# Patient Record
Sex: FEMALE | Race: BLACK OR AFRICAN AMERICAN | NOT HISPANIC OR LATINO | Employment: OTHER | URBAN - METROPOLITAN AREA
[De-identification: names, ages, dates, MRNs, and addresses within clinical notes are randomized per-mention and may not be internally consistent; named-entity substitution may affect disease eponyms.]

---

## 2017-08-01 ENCOUNTER — OFFICE VISIT (OUTPATIENT)
Dept: FAMILY MEDICINE | Facility: HOSPITAL | Age: 57
End: 2017-08-01
Attending: FAMILY MEDICINE
Payer: COMMERCIAL

## 2017-08-01 VITALS
WEIGHT: 273.38 LBS | DIASTOLIC BLOOD PRESSURE: 93 MMHG | BODY MASS INDEX: 48.44 KG/M2 | HEIGHT: 63 IN | HEART RATE: 77 BPM | SYSTOLIC BLOOD PRESSURE: 156 MMHG

## 2017-08-01 DIAGNOSIS — G47.33 OSA (OBSTRUCTIVE SLEEP APNEA): ICD-10-CM

## 2017-08-01 DIAGNOSIS — M54.41 CHRONIC LOW BACK PAIN WITH RIGHT-SIDED SCIATICA, UNSPECIFIED BACK PAIN LATERALITY: ICD-10-CM

## 2017-08-01 DIAGNOSIS — M79.604 LEG PAIN, ANTERIOR, RIGHT: ICD-10-CM

## 2017-08-01 DIAGNOSIS — G89.29 BILATERAL CHRONIC KNEE PAIN: ICD-10-CM

## 2017-08-01 DIAGNOSIS — Z86.010 PERSONAL HISTORY OF COLONIC POLYPS: ICD-10-CM

## 2017-08-01 DIAGNOSIS — N62 LARGE BREASTS: ICD-10-CM

## 2017-08-01 DIAGNOSIS — B18.2 CHRONIC HEPATITIS C WITHOUT HEPATIC COMA: ICD-10-CM

## 2017-08-01 DIAGNOSIS — M48.062 SPINAL STENOSIS OF LUMBAR REGION WITH NEUROGENIC CLAUDICATION: ICD-10-CM

## 2017-08-01 DIAGNOSIS — F32.A CHRONIC DEPRESSION: ICD-10-CM

## 2017-08-01 DIAGNOSIS — M25.562 BILATERAL CHRONIC KNEE PAIN: ICD-10-CM

## 2017-08-01 DIAGNOSIS — E66.01 MORBID OBESITY DUE TO EXCESS CALORIES: ICD-10-CM

## 2017-08-01 DIAGNOSIS — G89.4 CHRONIC PAIN ASSOCIATED WITH SIGNIFICANT PSYCHOSOCIAL DYSFUNCTION: ICD-10-CM

## 2017-08-01 DIAGNOSIS — J30.89 ALLERGIC RHINITIS DUE TO DUST MITE: ICD-10-CM

## 2017-08-01 DIAGNOSIS — M25.561 BILATERAL CHRONIC KNEE PAIN: ICD-10-CM

## 2017-08-01 DIAGNOSIS — G89.29 CHRONIC LOW BACK PAIN WITH RIGHT-SIDED SCIATICA, UNSPECIFIED BACK PAIN LATERALITY: ICD-10-CM

## 2017-08-01 DIAGNOSIS — I10 ESSENTIAL HYPERTENSION: Primary | ICD-10-CM

## 2017-08-01 PROCEDURE — 99215 OFFICE O/P EST HI 40 MIN: CPT | Performed by: FAMILY MEDICINE

## 2017-08-01 RX ORDER — GABAPENTIN 600 MG/1
600 TABLET ORAL 3 TIMES DAILY
Qty: 90 TABLET | Refills: 11 | Status: SHIPPED | OUTPATIENT
Start: 2017-08-01 | End: 2018-08-02 | Stop reason: SDUPTHER

## 2017-08-01 RX ORDER — DILTIAZEM HYDROCHLORIDE 300 MG/1
300 CAPSULE, EXTENDED RELEASE ORAL DAILY
Qty: 30 CAPSULE | Refills: 11 | Status: SHIPPED | OUTPATIENT
Start: 2017-08-01 | End: 2018-08-01

## 2017-08-01 RX ORDER — NORTRIPTYLINE HYDROCHLORIDE 50 MG/1
50 CAPSULE ORAL NIGHTLY
Qty: 30 CAPSULE | Refills: 11 | Status: SHIPPED | OUTPATIENT
Start: 2017-08-01 | End: 2018-08-01

## 2017-08-01 RX ORDER — CETIRIZINE HYDROCHLORIDE 10 MG/1
TABLET ORAL
Refills: 0 | COMMUNITY
Start: 2017-05-02 | End: 2017-08-02 | Stop reason: SDUPTHER

## 2017-08-01 RX ORDER — METOPROLOL SUCCINATE 100 MG/1
100 TABLET, EXTENDED RELEASE ORAL DAILY
Qty: 30 TABLET | Refills: 11 | Status: SHIPPED | OUTPATIENT
Start: 2017-08-01 | End: 2017-11-21

## 2017-08-01 RX ORDER — HYDRALAZINE HYDROCHLORIDE 50 MG/1
50 TABLET, FILM COATED ORAL EVERY 6 HOURS
Qty: 180 TABLET | Refills: 11 | Status: SHIPPED | OUTPATIENT
Start: 2017-08-01 | End: 2018-11-07 | Stop reason: SDUPTHER

## 2017-08-01 NOTE — PROGRESS NOTES
Patient ID: Farzad Rasheed is a 56 y.o. female.    Chief Complaint: Establish Care  Sees psychiatrist (Dr. Lerma) on [address] behind [facility].  Sees him on August 24, 2017.  Dr. Lopez, gastroenterology, had colonoscopy 4 years ago, and had 4 polys. Will call for appointment to see him and schedule another colonoscopy.  Had fibroids and hysterectomy in 2009.  States that had pap smear which she thinks was abnormal before surgery.  Was at [facility].  Will do pap in future.  Patient had oophorectomy at time of hysterectomy.  Last mammogram in 2-3 yrs. Will need to be rescheduled.          HPI:      ESSENTIAL HYPERTENSION : since 2004.  Currently on Diltiazem 300 mg 24 hr capsule. Hydralazine 50 mg  Qid, HCTZ 25 mg qday. Metoprolol 100 mg 24 hr once a day. Also was taking Avapro 300 mg per day for blood pressure but is no longer on this. Has chest pain, points with one finger ,describes as sharp.  No SOB, some SANTA, no PND orthopnea,  Some lower extremity edema.    DEPRESSION: taking xanax, gabapentin, celexa  topramax given by psychiatrist, Dr. Lerma.  Discussed with the patient that xanax is habit forming, and that this will be needed to be prescribed by her psychiatrist.    CHRONIC LOW BACK PAIN, AND RIGHT LEG PAIN AND LEFT KNEE PAIN .   Also takes Ibuprofen, but does not want them any more, because it has made her feel bad. Has history of spinal stenosis and  Bilateral knee pain. Will refer to physical therapy.  Will refer to pain specialist, Dr. Muhammad. Will increase Gabapentin to 600 mg tid. Will discontinue Welbutrin, and restart Pamelor 25 mg per HS.  TENS UNIT.     HEPATITIS C: treated with Harvani and recheck has been negative.    PERSONAL HISTORY OF COLON POLYPS: sees Dr. Lopez at  [facility].  Will call Dr. Thomason to make appointment for repeat colon check.    MORBID OBESITY: 48.43 BMI: REFER TO NUTRITION    SUSPECT OBSTR UCTIVE SLEEP APNEA: will need sleep study in future. Will refer to  Dr. Vasquez.  Patient currently lives in [place] and will be moving to [place] in the future.       HISTORY OF SPINAL STENOSIS: chronic low back pain.  See above.  Will make orthopedic referral.      LARGE BREAST: will need mammogram.        Past Medical History:   Diagnosis Date    Anxiety     Depression     Hypertension      Past Surgical History:   Procedure Laterality Date    HYSTERECTOMY       No family history on file.  Social History     Social History    Marital status:      Spouse name: N/A    Number of children: N/A    Years of education: N/A     Social History Main Topics    Smoking status: Smoker, Current Status Unknown    Smokeless tobacco: None    Alcohol use 0.0 oz/week     0 drink(s) per week    Drug use: No    Sexual activity: Not Asked     Other Topics Concern    None     Social History Narrative    None     Started smoking at 16 yrs old  One pack a day for years, and still smoking.    PATIENT DENIES KNOWLEDGE OF HAVING HAD  Diabetes, Glaucoma, Heart Disease, or Heart murmur, Sexually transmitted disease (Syphilis, Gonorrhea, Herpes, Trichomonis, Chlamydia, or Papilloma virus), varicose vein Problems,  Cancer, Asthma, Jaundice, Bleeding tendency, Kidney Disease, Pneumonia, Hepatitis, Stroke, Rheumatic Fever, Peptic Ulcer Disease, Seizures, , Blood Clots, Thyroid Disease, Migraine Headaches, Tuberculosis has hypertension,  Some acid reflux has occasional headaches.    Review of patient's allergies indicates:   Allergen Reactions    Embeda  [morphine-naltrexone]      Other reaction(s): Itching    Percocet  [oxycodone-acetaminophen]      Other reaction(s): Hallucinations         There is no immunization history on file for this patient.  Tetanus  Overdue, has had pneumonia, and needs flu shot this year.  Current Outpatient Prescriptions   Medication Sig Dispense Refill    alprazolam (XANAX) 0.5 MG tablet Take 0.5 mg by mouth 3 (three) times daily.      buPROPion (WELLBUTRIN  SR) 150 MG 12 hr tablet Take 150 mg by mouth. 1 Tablet Extended Release Oral Every day      cetirizine (ZYRTEC) 10 MG tablet TK 1 T PO QD  0    diazepam (VALIUM) 5 MG tablet Take 5 mg by mouth continuous prn.        diltiazem (CARDIZEM CD) 300 MG 24 hr capsule Take 300 mg by mouth. 1 Capsule, Ext Release 24 hr Oral Every day      ferrous sulfate 325 mg (65 mg iron) Tab Take by mouth. 1 Tablet Oral Twice a day       hydrALAZINE (APRESOLINE) 50 MG tablet Take 50 mg by mouth. 1 Tablet Oral Four times a day      hydrochlorothiazide (HYDRODIURIL) 25 MG tablet Take 25 mg by mouth. 1 Tablet Oral Every day      hydrocodone-acetaminophen (LORTAB)  mg per tablet       ibuprofen (ADVIL,MOTRIN) 600 MG tablet Take 600 mg by mouth. 1 Tablet Oral Every 6 hours      irbesartan (AVAPRO) 300 MG tablet Take 300 mg by mouth. 1 Tablet Oral Every day      metoprolol succinate (TOPROL XL) 100 MG 24 hr tablet Take 100 mg by mouth. 1 Tablet Extended Release 24 hr Oral Every day      nortriptyline (PAMELOR) 25 MG capsule Take 25 mg by mouth. 1 Capsule Oral Every evening      UNKNOWN TO PATIENT Two unknown medication one for anxiety/depression and cholesterol       No current facility-administered medications for this visit.      FAMILY HISTORY;   Mother had breast cancer, and  from this at 89 yrs old.  Had CVA hemorrhagic stroke, diabetes  Father  with heart attack and had kidney failure  Brother  with colon cancer  Age 58  Brother  of rare colon cancer age 51, diabetes  3 sister with hypertension  One sister with diabetes      Review of Systems:    GENERAL: NO fatigue, fever, unexplained weight loss  no dizziness or syncope has weight gain.    HEENT: NO Blurred vision, decreased vision, eye pain, decreased hearing, headaches,  Nose Bleeds, Neck stiffness, Neck pain, difficulty swallowing.      SKIN: No rashes, scars, tattoos, bruising.    PULMONARY: NO SOB, some SANTA, PND, Orthopnea, some  lower extremity  "edema    CARDIOVASCULAR: NO Chest pain, palpitations, history of MI, Stroke, No HX of MVP, or Rheumatic Fever    GI: NO Nausea, vomiting, diarrhea, constipation, blood in stool, black stool): Last colonoscopy:  4 years ago, had 4 polyps    : NO  blood in urine: no unusual vaginal bleeding, discharge, pelvic pain, or dyspareunia.    HEMATOPOETIC/LYMPHATIC:  NO   bleeding gums, hx. of free bleeding, enlarged lymph nodes, or easy bruising    MUSCULOSKELETAL: NO Muscle pain, limitation of motion of joints, heat , redness of joints: chronic back pain, right leg and knee, left knee pain\    ENDOCRINE: NO Intolerance to heat or cold, no palpitations, no increased thirst, hunger or urination.      NEURO:  NO Localized weakness, no  numbness in  face , hands or feet. Numbness, tingling and burning in legs and thighs.    PSYCHIATIRIC:  NO Increased sadness, tearfulness, loss of interest in things that you Use to like to do, no  hypersombulance, Thoughts of killing self or others   some insomnia.    BREAST: no discharge , lumps, pain etc.    Physical Exam::  BP (!) 156/93   Pulse 77   Ht 5' 3" (1.6 m)   Wt 124 kg (273 lb 5.9 oz)   BMI 48.43 kg/m²     GENERAL APPEARANCE: Alert, well nourished, well developed, well groomed,appears stated age, in no acute distress     HEENT: normocephalic , atraumatic, PERRLA, fundi benign, disk sharp, no  Hemorrhages or exudates. BILATERAL CATARACTS.  EOM intact, no scleral icterus or conjunctival injection.         EARS: Normal light reflex, landmarks intact. TM intact         NOSE: Turbinates normal, no septal deviation, no obstruction of ostia          MOUTH: Good oral hygiene,, moist mucus Membranes, no hyperemia or exudate.     NECK: NO JVD, bruits, thyromegaly, or lymphadenopathy. Has short neck and history of snoring    CHEST: Good excursion, no rales, rhonchi,wheezes.  Clear to auscultation and per-cussion  bilaterally: Normal  Shaped chest.    HEART: Regular rate and rhythm, no " murmurs no gallops or rubs. PMI 5th ICS, normal S1,S2    ABDOMEN: Soft,  Good bowel Sounds. Morbid obesity. NO RRRT.    EXTREMITIES: Positive pulses, positive reflexes, positive sensory to filament.  No Cyanosis, no clubbing or edema    UROGENITAL: NEXT VISIT.     RECTAL: NEXT VISIT    NEURO: Alert, responsive, CN II-XII intact grossly, motor and sensory intact.  Oriented times 4    PSYCHIATRIC: Mental status-normal affect, normal mood    BREAST: NO masses, discharge, dimpling, retractions, or lymph nodes (SC,IC,AX)    Assessment/Plan:      ESSENTIAL HYPERTENSION : since 2004.  Currently on Diltiazem 300 mg 24 hr capsule. Hydralazine 50 mg  Qid, HCTZ 25 mg qday. Metoprolol 100 mg 24 hr once a day. Also was taking Avapro 300 mg per day for blood pressure but is no longer on this. Has chest pain, points with one finger ,describes as sharp.  No SOB, some SANTA, no PND orthopnea,  Some lower extremity edema.    DEPRESSION: taking xanax, gabapentin, celexa  topramax given by psychiatrist, Dr. Lerma.  Discussed with the patient that xanax is habit forming, and that this will be needed to be prescribed by her psychiatrist.    CHRONIC LOW BACK PAIN, AND RIGHT LEG PAIN AND LEFT KNEE PAIN .   Also takes Ibuprofen, but does not want them any more, because it has made her feel bad. Has history of spinal stenosis and  Bilateral knee pain. Will refer to physical therapy.  Will refer to pain specialist, Dr. Muhammad. Will increase Gabapentin to 600 mg tid. Will discontinue Welbutrin, and restart Pamelor 25 mg per HS.  TENS UNIT.     HEPATITIS C: treated with Harvani and recheck has been negative.    PERSONAL HISTORY OF COLON POLYPS: sees Dr. Lopez at  [facility].  Will call Dr. Thomason to make appointment for repeat colon check.    MORBID OBESITY: 48.43 BMI: REFER TO NUTRITION    SUSPECT OBSTR UCTIVE SLEEP APNEA: will need sleep study in future. Will refer to Dr. Vasquez.  Patient currently lives in [place] and will be  moving to [place] in the future.       HISTORY OF SPINAL STENOSIS: chronic low back pain.  See above.  Will make orthopedic referral.  Increase Gabapentin to 600 mg tid,  Start Pamelor 25 mg q hs.    LARGE BREAST: will need mammogram.      HEALTH MAINTENANCE:  Will order CBC, CMP, Lipid, CPK, fT4, TSH, Hgb A 1 c,  Mammogram, orthopedic referral, Pain management referral,     Return after 9/11/2918  There are no diagnoses linked to this encounter.    No Follow-up on file.

## 2017-08-02 DIAGNOSIS — J30.89 ALLERGIC RHINITIS DUE TO OTHER ALLERGIC TRIGGER, UNSPECIFIED CHRONICITY, UNSPECIFIED SEASONALITY: Primary | ICD-10-CM

## 2017-08-02 RX ORDER — CETIRIZINE HYDROCHLORIDE 10 MG/1
10 TABLET ORAL NIGHTLY
Qty: 30 TABLET | Refills: 5 | Status: SHIPPED | OUTPATIENT
Start: 2017-08-02 | End: 2017-09-01

## 2017-08-02 RX ORDER — IRBESARTAN 300 MG/1
300 TABLET ORAL
Status: CANCELLED | OUTPATIENT
Start: 2017-08-02

## 2017-08-02 RX ORDER — CETIRIZINE HYDROCHLORIDE 10 MG/1
10 TABLET ORAL DAILY
Qty: 30 TABLET | Refills: 11 | COMMUNITY
Start: 2017-08-02 | End: 2018-08-02

## 2017-08-02 NOTE — TELEPHONE ENCOUNTER
----- Message from Mj Gomes sent at 8/2/2017  8:20 AM CDT -----  Pt call stating dr salinas for got to put in her avapro/cetrizine

## 2017-08-03 ENCOUNTER — HOSPITAL ENCOUNTER (OUTPATIENT)
Dept: RADIOLOGY | Facility: HOSPITAL | Age: 57
Discharge: HOME | End: 2017-08-03
Attending: FAMILY MEDICINE
Payer: COMMERCIAL

## 2017-08-03 DIAGNOSIS — N62 LARGE BREASTS: ICD-10-CM

## 2017-08-03 PROCEDURE — 77063 BREAST TOMOSYNTHESIS BI: CPT | Mod: 26,,, | Performed by: RADIOLOGY

## 2017-08-03 PROCEDURE — 77067 SCR MAMMO BI INCL CAD: CPT | Mod: TC

## 2017-08-03 PROCEDURE — 77067 SCR MAMMO BI INCL CAD: CPT | Mod: 26,,, | Performed by: RADIOLOGY

## 2017-08-04 ENCOUNTER — TELEPHONE (OUTPATIENT)
Dept: RADIOLOGY | Facility: HOSPITAL | Age: 57
End: 2017-08-04

## 2017-08-25 ENCOUNTER — TELEPHONE (OUTPATIENT)
Dept: FAMILY MEDICINE | Facility: HOSPITAL | Age: 57
End: 2017-08-25

## 2017-08-25 DIAGNOSIS — K21.00 GASTROESOPHAGEAL REFLUX DISEASE WITH ESOPHAGITIS: ICD-10-CM

## 2017-08-25 DIAGNOSIS — K21.00 GASTROESOPHAGEAL REFLUX DISEASE WITH ESOPHAGITIS: Primary | ICD-10-CM

## 2017-08-25 RX ORDER — SUCRALFATE 1 G/1
TABLET ORAL
Qty: 360 TABLET | Refills: 2 | Status: SHIPPED | OUTPATIENT
Start: 2017-08-25

## 2017-08-25 RX ORDER — SUCRALFATE 1 G/1
1 TABLET ORAL 4 TIMES DAILY
Qty: 100 TABLET | Refills: 2 | Status: SHIPPED | OUTPATIENT
Start: 2017-08-25 | End: 2017-08-25 | Stop reason: SDUPTHER

## 2017-08-25 RX ORDER — PANTOPRAZOLE SODIUM 20 MG/1
20 TABLET, DELAYED RELEASE ORAL
Qty: 60 TABLET | Refills: 11 | Status: SHIPPED | OUTPATIENT
Start: 2017-08-25 | End: 2018-08-25

## 2017-08-25 NOTE — TELEPHONE ENCOUNTER
----- Message from Kelly Resendez sent at 8/23/2017  3:53 PM CDT -----  Having severe acid reflux and throwing up brown stuff can the doctor prescribe me something please

## 2017-09-25 DIAGNOSIS — M19.90 ARTHRITIS: ICD-10-CM

## 2017-09-25 DIAGNOSIS — M19.90 ARTHRITIS: Primary | ICD-10-CM

## 2017-09-25 RX ORDER — IBUPROFEN 600 MG/1
600 TABLET ORAL 3 TIMES DAILY
Qty: 90 TABLET | Refills: 0 | Status: SHIPPED | OUTPATIENT
Start: 2017-09-25 | End: 2017-09-25 | Stop reason: SDUPTHER

## 2017-09-26 NOTE — TELEPHONE ENCOUNTER
----- Message from Sheila Russell sent at 9/25/2017  2:35 PM CDT -----  Pt she requesting refills on her ibuprofen (ADVIL,MOTRIN) 600 MG tablet  Please call pt asap .

## 2017-11-21 RX ORDER — METOPROLOL SUCCINATE 100 MG/1
TABLET, EXTENDED RELEASE ORAL
Refills: 11 | COMMUNITY
Start: 2017-11-04

## 2017-11-21 RX ORDER — ALPRAZOLAM 2 MG/1
TABLET ORAL
Refills: 2 | COMMUNITY
Start: 2017-11-04

## 2017-11-21 RX ORDER — TOPIRAMATE 25 MG/1
TABLET ORAL
Refills: 3 | COMMUNITY
Start: 2017-11-04

## 2017-11-21 RX ORDER — CITALOPRAM 20 MG/1
TABLET, FILM COATED ORAL
Refills: 1 | COMMUNITY
Start: 2017-11-04

## 2018-04-12 PROBLEM — 275978004 SCREENING FOR MALIGNANT NEOPLASM OF COLON: Status: ACTIVE | Noted: 2018-04-12

## 2018-04-12 PROBLEM — 14760008 CONSTIPATION: Status: ACTIVE | Noted: 2018-04-12

## 2018-04-30 DIAGNOSIS — K21.00 GASTROESOPHAGEAL REFLUX DISEASE WITH ESOPHAGITIS: ICD-10-CM

## 2018-05-01 RX ORDER — SUCRALFATE 1 G/1
TABLET ORAL
Qty: 100 TABLET | Refills: 0 | Status: SHIPPED | OUTPATIENT
Start: 2018-05-01

## 2018-06-04 ENCOUNTER — TELEPHONE (OUTPATIENT)
Dept: FAMILY MEDICINE | Facility: HOSPITAL | Age: 58
End: 2018-06-04

## 2018-06-05 RX ORDER — METOPROLOL SUCCINATE 100 MG/1
TABLET, EXTENDED RELEASE ORAL
Refills: 11 | OUTPATIENT
Start: 2018-06-05

## 2018-08-02 DIAGNOSIS — M54.41 CHRONIC LOW BACK PAIN WITH RIGHT-SIDED SCIATICA, UNSPECIFIED BACK PAIN LATERALITY: ICD-10-CM

## 2018-08-02 DIAGNOSIS — G89.29 CHRONIC LOW BACK PAIN WITH RIGHT-SIDED SCIATICA, UNSPECIFIED BACK PAIN LATERALITY: ICD-10-CM

## 2018-08-02 RX ORDER — GABAPENTIN 600 MG/1
TABLET ORAL
Qty: 90 TABLET | Refills: 0 | Status: SHIPPED | OUTPATIENT
Start: 2018-08-02

## 2018-11-07 DIAGNOSIS — I10 ESSENTIAL HYPERTENSION: ICD-10-CM

## 2018-11-07 RX ORDER — HYDRALAZINE HYDROCHLORIDE 50 MG/1
TABLET, FILM COATED ORAL
Qty: 180 TABLET | Refills: 0 | Status: SHIPPED | OUTPATIENT
Start: 2018-11-07

## 2019-04-24 RX ORDER — IBUPROFEN 600 MG/1
TABLET ORAL
Qty: 270 TABLET | Refills: 0 | Status: SHIPPED | OUTPATIENT
Start: 2019-04-24

## 2019-08-08 DIAGNOSIS — I10 ESSENTIAL HYPERTENSION: ICD-10-CM

## 2019-09-03 RX ORDER — HYDRALAZINE HYDROCHLORIDE 50 MG/1
TABLET, FILM COATED ORAL
Qty: 180 TABLET | Refills: 0 | OUTPATIENT
Start: 2019-09-03

## 2021-06-15 ENCOUNTER — OFFICE VISIT (OUTPATIENT)
Dept: URBAN - METROPOLITAN AREA CLINIC 25 | Facility: CLINIC | Age: 61
End: 2021-06-15

## 2022-04-30 ENCOUNTER — TELEPHONE ENCOUNTER (OUTPATIENT)
Dept: URBAN - METROPOLITAN AREA CLINIC 121 | Facility: CLINIC | Age: 62
End: 2022-04-30

## 2022-05-01 ENCOUNTER — TELEPHONE ENCOUNTER (OUTPATIENT)
Dept: URBAN - METROPOLITAN AREA CLINIC 121 | Facility: CLINIC | Age: 62
End: 2022-05-01

## 2022-05-01 RX ORDER — SODIUM SULFATE, POTASSIUM SULFATE, MAGNESIUM SULFATE 17.5; 3.13; 1.6 G/ML; G/ML; G/ML
MIX AND USE AS DIRECTED SOLUTION, CONCENTRATE ORAL
Status: ACTIVE | COMMUNITY
Start: 2018-04-12